# Patient Record
Sex: FEMALE | Race: WHITE | NOT HISPANIC OR LATINO | URBAN - METROPOLITAN AREA
[De-identification: names, ages, dates, MRNs, and addresses within clinical notes are randomized per-mention and may not be internally consistent; named-entity substitution may affect disease eponyms.]

---

## 2023-02-26 PROBLEM — N60.01 SOLITARY CYST OF RIGHT BREAST: Status: ACTIVE | Noted: 2023-02-26

## 2023-02-26 PROBLEM — N63.15 BREAST LUMP ON RIGHT SIDE AT 3 O'CLOCK POSITION: Status: ACTIVE | Noted: 2023-02-26

## 2023-02-26 PROBLEM — Z80.3 FAMILY HISTORY OF MALIGNANT NEOPLASM OF BREAST: Status: ACTIVE | Noted: 2023-02-26

## 2023-02-26 NOTE — PROGRESS NOTES
[facility]    Chief Complaint:   Shanti Schaeffer is a 62 y.o. female presenting today for her 6 month follow up.. She is due for Ultrasound.  She reports no interval changes.     History of Present Illness:   Mrs. Schaeffer first presented on August 18, 2021 after a right breast mass was noticed. Her imaging was consistent with a benign complicated cyst and close follow up was recommended. MD::: Luis Frances MD.    Past Medical History:   Diagnosis Date    Abnormal mammogram     Breast lump on right side at 3 o'clock position 2/26/2023    She understands that her imaging and exams have remained stable and is comfortable being followed in a conservative fashion. She understands the importance of monthly self-breast examination and knows to report any and all changes as they occur.    Family history of malignant neoplasm of breast 2/26/2023    Hyperlipidemia     Hypertension     Solitary cyst of right breast 2/26/2023    We discussed our fibrocystic mastopathy protocol in detail.       Past Surgical History:   Procedure Laterality Date    CHOLECYSTECTOMY      COLONOSCOPY      FACIAL RECONSTRUCTION SURGERY      CAM[facility]LE [facility]  07/31/1996        Current Outpatient Medications:     albuterol (VENTOLIN HFA) 90 mcg/actuation inhaler, Inhale 2 puffs into the lungs every 6 (six) hours as needed for Wheezing. Rescue, Disp: 18 g, Rfl: 0    aspirin (ECOTRIN) 81 MG EC tablet, Take 81 mg by mouth once daily., Disp: , Rfl:     atorvastatin (LIPITOR) 10 MG tablet, take 1 tablet by mouth once daily, Disp: 90 tablet, Rfl: 0    azithromycin (Z-JERRY) 250 MG tablet, Take 2 tablets by mouth on day 1; Take 1 tablet by mouth on days 2-5, Disp: 6 tablet, Rfl: 0    cholecalciferol, vitamin D3, (VITAMIN D3) 50 mcg (2,000 unit) Cap capsule, Take 1 capsule by mouth once daily., Disp: , Rfl:     CLENPIQ 10 mg-3.5 gram -12 gram/160 mL Soln, TAKE PREP BY MOUTH SPLIT DOSES AS DIRECTED FOR COLONOSCOPY PREP, Disp: ,  Rfl:     cyanocobalamin (VITAMIN B-12) 1000 MCG tablet, Take 100 mcg by mouth once daily., Disp: , Rfl:     estradioL (ESTRACE) 1 MG tablet, Take 0.5 tablets (0.5 mg total) by mouth once daily., Disp: 90 tablet, Rfl: 3    hydroCHLOROthiazide (HYDRODIURIL) 25 MG tablet, take 1 tablet by mouth once daily, Disp: 90 tablet, Rfl: 0    omega-3 fatty acids/fish oil (FISH OIL-OMEGA-3 FATTY ACIDS) 300-1,000 mg capsule, Take by mouth once daily., Disp: , Rfl:     ondansetron (ZOFRAN) 8 MG tablet, TAKE 1 TABLET BY MOUTH TWICE A DAY AS NEEDED FOR NAUSEA FOR 5 DAYS, Disp: , Rfl:     pantoprazole (PROTONIX) 40 MG tablet, TAKE 1 TABLET BY MOUTH EVERY MORNING 30 MINUTES BEFORE BREAKFAST, Disp: , Rfl:     propranoloL (INDERAL) 40 MG tablet, take 1 tablet by mouth once daily, Disp: 90 tablet, Rfl: 0    vitamin E 1000 UNIT capsule, Take 1,000 Units by mouth once daily., Disp: , Rfl:    Review of patient's allergies indicates:   Allergen Reactions    Chlorpheniramine-phenylephrine       Social History     Tobacco Use    Smoking status: Former    Smokeless tobacco: Never   Substance Use Topics    Alcohol use: Yes      Family History   Problem Relation Age of Onset    Breast cancer Mother     Colon cancer Brother       Review of Systems   Genitourinary:  Negative for hot flashes.   Integumentary:  Negative for color change, rash, mole/lesion, breast mass, breast discharge and breast tenderness.   Breast: Negative for mass and tenderness   Physical Exam  Chest:   Breasts:     Right: No mass, nipple discharge, skin change or tenderness.      Left: No mass, nipple discharge, skin change or tenderness.   Lymphadenopathy:      Upper Body:      Right upper body: No axillary adenopathy.      Left upper body: No axillary adenopathy.   Ultrasound: Stable complicated cyst   Mammogram:  Stable without suspicious interval change.    1. Breast lump on right side at 3 o'clock position  Overview:  She understands that her imaging and exams have  remained stable and is comfortable being followed in a conservative fashion. She understands the importance of monthly self-breast examination and knows to report any and all changes as they occur.      2. Solitary cyst of right breast  Overview:  We discussed our fibrocystic mastopathy protocol in detail.        3. Family history of malignant neoplasm of breast  Overview:  We discussed her family history and how it could impact her own future risks.  We discussed family vs. genetic history and the importance of each.  Genetic Counseling/Testing was offered, and all questions answered.                 Medical Decision Making:    It is my impression that this patient suffers all conditions contained in this medical document.  Each of these conditions did affect our plan of care and my medical decision making today.  It is my opinion that the medical decision making concerning this patient was of minimal  difficulty based on the aforementioned conditions.  Any further recommendations will be communicated to the patient by me.  I have reviewed and verified her allergies, list of medications, medical and surgical histories, social history, and a pertinent review of symptoms.     Follow up:  6 months and prn    For:  US diya henderson (D) right at [facility]

## 2023-03-01 ENCOUNTER — OFFICE VISIT (OUTPATIENT)
Dept: SURGERY | Facility: CLINIC | Age: 63
End: 2023-03-01
Payer: COMMERCIAL

## 2023-03-01 DIAGNOSIS — Z80.3 FAMILY HISTORY OF MALIGNANT NEOPLASM OF BREAST: ICD-10-CM

## 2023-03-01 DIAGNOSIS — N63.15 BREAST LUMP ON RIGHT SIDE AT 3 O'CLOCK POSITION: ICD-10-CM

## 2023-03-01 DIAGNOSIS — N60.01 SOLITARY CYST OF RIGHT BREAST: ICD-10-CM

## 2023-03-01 PROCEDURE — 99213 OFFICE O/P EST LOW 20 MIN: CPT | Mod: S$GLB,,, | Performed by: NURSE PRACTITIONER

## 2023-03-01 PROCEDURE — 99213 PR OFFICE/OUTPT VISIT, EST, LEVL III, 20-29 MIN: ICD-10-PCS | Mod: S$GLB,,, | Performed by: NURSE PRACTITIONER

## 2023-09-04 DIAGNOSIS — N63.15 BREAST LUMP ON RIGHT SIDE AT 3 O'CLOCK POSITION: Primary | ICD-10-CM

## 2023-09-05 NOTE — PROGRESS NOTES
[facility]  MD Shannon Saenz, NP-C    Chief Complaint:   Shanti Schaeffer is a 63 y.o. female presenting today for  6 month follow up. She is due for Ultrasound  She reports no interval changes on her self-breast examination.     History of Present Illness:   Mrs. Schaeffer first presented on August 18, 2021 after a right breast mass was noticed. Her imaging was consistent with a benign complicated cyst and close follow up was recommended. MD::: Luis Frances MD.    Past Medical History:   Diagnosis Date    Abnormal mammogram     Breast lump on right side at 3 o'clock position 2/26/2023    She understands that her imaging and exams have remained stable and is comfortable being followed in a conservative fashion. She understands the importance of monthly self-breast examination and knows to report any and all changes as they occur.    Family history of malignant neoplasm of breast 2/26/2023    Hyperlipidemia     Hypertension     Solitary cyst of right breast 2/26/2023    We discussed our fibrocystic mastopathy protocol in detail.       Past Surgical History:   Procedure Laterality Date    CHOLECYSTECTOMY      COLONOSCOPY      FACIAL RECONSTRUCTION SURGERY      CAMILLE [facility]  07/31[birth year]1996        Current Outpatient Medications:     aspirin (ECOTRIN) 81 MG EC tablet, Take 81 mg by mouth once daily., Disp: , Rfl:     atorvastatin (LIPITOR) 10 MG tablet, Take 1 tablet (10 mg total) by mouth once daily., Disp: 90 tablet, Rfl: 1    cholecalciferol, vitamin D3, (VITAMIN D3) 50 mcg (2,000 unit) Cap capsule, Take 1 capsule by mouth once daily., Disp: , Rfl:     cyanocobalamin (VITAMIN B-12) 1000 MCG tablet, Take 100 mcg by mouth once daily., Disp: , Rfl:     estradioL (ESTRACE) 1 MG tablet, Take 0.5 tablets (0.5 mg total) by mouth once daily., Disp: 90 tablet, Rfl: 3    varsha primrose/linoleic/g-lenic (PRIMROSE OIL ORAL), Take by mouth., Disp: , Rfl:     hydroCHLOROthiazide  (HYDRODIURIL) 25 MG tablet, Take 1 tablet (25 mg total) by mouth once daily., Disp: 90 tablet, Rfl: 1    omega-3 fatty acids/fish oil (FISH OIL-OMEGA-3 FATTY ACIDS) 300-1,000 mg capsule, Take by mouth once daily., Disp: , Rfl:     pantoprazole (PROTONIX) 40 MG tablet, TAKE 1 TABLET BY MOUTH EVERY MORNING 30 MINUTES BEFORE BREAKFAST, Disp: , Rfl:     propranoloL (INDERAL) 40 MG tablet, Take 1 tablet (40 mg total) by mouth once daily., Disp: 90 tablet, Rfl: 1    scopolamine (TRANSDERM-SCOP) 1.3-1.5 mg (1 mg over 3 days), Place 1 patch onto the skin every 72 hours., Disp: 4 patch, Rfl: 0    vitamin E 1000 UNIT capsule, Take 1,000 Units by mouth once daily., Disp: , Rfl:    Review of patient's allergies indicates:  No Known Allergies   Social History     Tobacco Use    Smoking status: Former    Smokeless tobacco: Never   Substance Use Topics    Alcohol use: Yes      Family History   Problem Relation Age of Onset    Breast cancer Mother     Cancer Father         bone    Colon cancer Brother         Review of Systems   Integumentary:  Negative for color change, rash, mole/lesion, breast mass, breast discharge and breast tenderness.   Breast: Negative for mass and tenderness       Physical Exam   HENT:   Head: Normocephalic.   Pulmonary/Chest: Right breast exhibits no inverted nipple, no mass, no nipple discharge, no skin change and no tenderness. Left breast exhibits no inverted nipple, no mass, no nipple discharge, no skin change and no tenderness. No breast swelling.   Genitourinary: No breast swelling.   Musculoskeletal: Lymphadenopathy:      Upper Body:      Right upper body: No supraclavicular or axillary adenopathy.      Left upper body: No supraclavicular or axillary adenopathy.     Neurological: She is alert.      Ultrasound: Stable benign appearing cystic changes.    Assessment/Plan  1. Breast lump on right side at 3 o'clock position  Assessment & Plan:  We reviewed our findings today and her questions were  answered.  She understands that her imaging and exams have remained stable (and show nothing concerning).  She is comfortable being followed in a conservative fashion.      She understands the importance of monthly self-breast examination and knows to report any and all changes as they occur.'      2. Solitary cyst of right breast  Assessment & Plan:  We discussed our Fibrocystic Mastopathy Protocol in detail. She should take Vitamin E 800 IU everyday x 3 months or until non-tender then can stop Vitamin E vs. continue daily at 400 IU.  The use of ice packs or warms soaks to tender area of the breast may also be of some benefit.  If warm soaks help her tenderness - She can use Aspercreme (unless allergic to Aspirin) on the affected area.  Ibuprofen (if no contraindications) at 800 mg three times per day for 5 days can also relieve many symptoms associated with swollen or inflamed tissue.  She can repeat Ibuprofen for 5 days, but then should be off for 5 days as it may cause gastric upset.  It is a good idea to wear a tight bra during the day and night to minimize movement of the tender area (Sports Bras work well).  Evening Primrose Oil can be bought over the counter and used at a dose of 3000 mg per day to help with any breast pain/tenderness not improved by implementing the above measures.        3. Family history of malignant neoplasm of breast  Assessment & Plan:  We discussed her family history and how it could impact her own future risks.  We discussed family vs. genetic history and the importance and implications of each.  All questions answered to her satisfaction.  She knows that as additional family members are diagnosed - she will need to let us know as this may change follow up and imaging recommendations.    We had a discussion concerning Breast Cancer Risk Reduction and current NCCN Guidelines. She knows that her risk can be lowered slightly with a healthy lifestyle and minimal ETOH use. Being  physically active will also help. She should reduce or stay away from OCPs and HRT as possible.                Medical Decision Making:  It is my impression that this patient suffers all conditions contained in this medical document.  Each of these conditions did affect our plan of care and my medical decision making today.  It is my opinion that the medical decision making concerning this patient was of moderate difficulty based on the aforementioned conditions.  Any further recommendations will be communicated to the patient by me.  I have reviewed and verified her allergies, list of medications, medical and surgical histories, social history, and a pertinent review of symptoms.      Follow up:  6 months and prn    For:  MGLAMINE (D) at [facility] and  (D) diya henderson right at [facility]

## 2023-09-05 NOTE — ASSESSMENT & PLAN NOTE
We reviewed our findings today and her questions were answered.  She understands that her imaging and exams have remained stable (and show nothing concerning).  She is comfortable being followed in a conservative fashion.      She understands the importance of monthly self-breast examination and knows to report any and all changes as they occur.'

## 2023-09-13 ENCOUNTER — OFFICE VISIT (OUTPATIENT)
Dept: SURGERY | Facility: CLINIC | Age: 63
End: 2023-09-13
Payer: COMMERCIAL

## 2023-09-13 DIAGNOSIS — N63.15 BREAST LUMP ON RIGHT SIDE AT 3 O'CLOCK POSITION: Primary | ICD-10-CM

## 2023-09-13 DIAGNOSIS — N60.01 SOLITARY CYST OF RIGHT BREAST: ICD-10-CM

## 2023-09-13 DIAGNOSIS — Z80.3 FAMILY HISTORY OF MALIGNANT NEOPLASM OF BREAST: ICD-10-CM

## 2023-09-13 PROCEDURE — 99213 PR OFFICE/OUTPT VISIT, EST, LEVL III, 20-29 MIN: ICD-10-PCS | Mod: S$GLB,,, | Performed by: NURSE PRACTITIONER

## 2023-09-13 PROCEDURE — 1160F PR REVIEW ALL MEDS BY PRESCRIBER/CLIN PHARMACIST DOCUMENTED: ICD-10-PCS | Mod: CPTII,S$GLB,, | Performed by: NURSE PRACTITIONER

## 2023-09-13 PROCEDURE — 99213 OFFICE O/P EST LOW 20 MIN: CPT | Mod: S$GLB,,, | Performed by: NURSE PRACTITIONER

## 2023-09-13 PROCEDURE — 1159F MED LIST DOCD IN RCRD: CPT | Mod: CPTII,S$GLB,, | Performed by: NURSE PRACTITIONER

## 2023-09-13 PROCEDURE — 1159F PR MEDICATION LIST DOCUMENTED IN MEDICAL RECORD: ICD-10-PCS | Mod: CPTII,S$GLB,, | Performed by: NURSE PRACTITIONER

## 2023-09-13 PROCEDURE — 1160F RVW MEDS BY RX/DR IN RCRD: CPT | Mod: CPTII,S$GLB,, | Performed by: NURSE PRACTITIONER

## 2023-09-13 PROCEDURE — 3044F HG A1C LEVEL LT 7.0%: CPT | Mod: CPTII,S$GLB,, | Performed by: NURSE PRACTITIONER

## 2023-09-13 PROCEDURE — 3044F PR MOST RECENT HEMOGLOBIN A1C LEVEL <7.0%: ICD-10-PCS | Mod: CPTII,S$GLB,, | Performed by: NURSE PRACTITIONER

## 2024-03-04 DIAGNOSIS — N63.15 BREAST LUMP ON RIGHT SIDE AT 3 O'CLOCK POSITION: Primary | ICD-10-CM

## 2024-03-05 NOTE — ASSESSMENT & PLAN NOTE
We discussed her family history and how it could impact her own future risks.  We discussed family vs. genetic history and the importance and implications of each.  All questions answered to her satisfaction.  She knows that as additional family members are diagnosed - she will need to let us know as this may change follow up and imaging recommendations.    Long discussion with her about her family history and the increased risk that is attributable.  We discussed Genetic Counseling/Testing at length and information was given to address these.   Her Lifetime Risk is only 10.95% based on the [employer] Tool. The population risk is 6.79%- so she not at a very high risk.   Her family and personal history were confirmed, and I believe this risk to be true based on its limitations.  She knows that there are certain elements that increase/decrease her risk that are not amendable to .  This risk is less than 20% and does not meet the threshold for additional annual imaging with MRI.  She will also not need to be followed in the High-Risk setting and can be seen on an annual basis.     We had a discussion concerning Breast Cancer Risk Reduction and current NCCN Guidelines. She knows that her risk can be lowered slightly with a healthy lifestyle and minimal ETOH use. Being physically active will also help. She should reduce or stay away from OCPs and HRT as possible.

## 2024-03-05 NOTE — PROGRESS NOTES
[facility]  MD Shannon Saenz, NP-C    Date of Service: 3/11/2024      Chief Complaint:   Shanti Schaeffer is a 63 y.o. female presenting today for  6 month follow up. She is due for Mammogram and Ultrasound  She reports no interval changes on her self-breast examination.     History of Present Illness:   Mrs. Schaeffer first presented on August 18, 2021 after a right breast mass was noticed. Her imaging was consistent with a benign complicated cyst and close follow up was recommended. MD::: Luis Frances MD; Shivam Allen MD.     Past Medical History:   Diagnosis Date    Abnormal mammogram     Breast lump on right side at 3 o'clock position 2/26/2023    She understands that her imaging and exams have remained stable and is comfortable being followed in a conservative fashion. She understands the importance of monthly self-breast examination and knows to report any and all changes as they occur.    Family history of malignant neoplasm of breast 2/26/2023    Hyperlipidemia     Hypertension     Solitary cyst of right breast 2/26/2023    We discussed our fibrocystic mastopathy protocol in detail.       Past Surgical History:   Procedure Laterality Date    CHOLECYSTECTOMY      COLONOSCOPY      FACIAL RECONSTRUCTION SURGERY      [facility]ILLE [facility]  07/31[birth year]1996        Current Outpatient Medications:     aspirin (ECOTRIN) 81 MG EC tablet, Take 81 mg by mouth once daily., Disp: , Rfl:     atorvastatin (LIPITOR) 10 MG tablet, Take 1 tablet (10 mg total) by mouth once daily., Disp: 90 tablet, Rfl: 0    cholecalciferol, vitamin D3, (VITAMIN D3) 50 mcg (2,000 unit) Cap capsule, Take 1 capsule by mouth once daily., Disp: , Rfl:     cyanocobalamin (VITAMIN B-12) 1000 MCG tablet, Take 100 mcg by mouth once daily., Disp: , Rfl:     dicyclomine (BENTYL) 10 MG capsule, Take 1 capsule by mouth three times a day as needed for pain, Disp: , Rfl:     estradioL (ESTRACE) 1 MG tablet,  Take 0.5 tablets (0.5 mg total) by mouth once daily., Disp: 45 tablet, Rfl: 3    varsha primrose/linoleic/g-lenic (PRIMROSE OIL ORAL), Take by mouth., Disp: , Rfl:     hydroCHLOROthiazide (HYDRODIURIL) 25 MG tablet, take 1 tablet by mouth once a day, Disp: 90 tablet, Rfl: 0    omega-3 fatty acids/fish oil (FISH OIL-OMEGA-3 FATTY ACIDS) 300-1,000 mg capsule, Take by mouth once daily., Disp: , Rfl:     omeprazole (PRILOSEC) 40 MG capsule, Take 40 mg by mouth every morning. before breakfast., Disp: , Rfl:     propranoloL (INDERAL) 40 MG tablet, Take 1 tablet (40 mg total) by mouth once daily., Disp: 90 tablet, Rfl: 0    scopolamine (TRANSDERM-SCOP) 1.3-1.5 mg (1 mg over 3 days), Place 1 patch onto the skin every 72 hours., Disp: 4 patch, Rfl: 0    vitamin E 1000 UNIT capsule, Take 1,000 Units by mouth once daily., Disp: , Rfl:    Review of patient's allergies indicates:  No Known Allergies   Social History     Tobacco Use    Smoking status: Former    Smokeless tobacco: Never   Substance Use Topics    Alcohol use: Yes      Family History   Problem Relation Age of Onset    Breast cancer Mother 50 - 59        dx 50's    Cancer Father         bone    Colon cancer Brother     Ovarian cancer Neg Hx         Review of Systems   Integumentary:  Negative for color change, rash, mole/lesion, breast mass, breast discharge and breast tenderness.   Breast: Negative for mass and tenderness       Physical Exam   HENT:   Head: Normocephalic.   Pulmonary/Chest: Right breast exhibits no inverted nipple, no mass, no nipple discharge, no skin change and no tenderness. Left breast exhibits no inverted nipple, no mass, no nipple discharge, no skin change and no tenderness. No breast swelling.   Genitourinary: No breast swelling.   Musculoskeletal: Lymphadenopathy:      Upper Body:      Right upper body: No supraclavicular or axillary adenopathy.      Left upper body: No supraclavicular or axillary adenopathy.     Neurological: She is alert.       MAMMOGRAM REPORT: She has some diffuse fibronodular tissue; there are no spiculated lesions, distortions or suspicious calcifications noted; NEM    Ultrasound: Stable benign-appearing mass measuring 3 mm in diameter in the right breast 3 o'clock position 3 cm from the nipple. This is unchanged dating back to 08/18/2021. No additional is follow-up necessary.     NOTE:::We viewed her films together at today's visit.  We discussed the multiple views obtained and the important findings.  Even benign changes were mentioned and her questions were answered.  She knows that she may receive a formal letter or report from the Radiologist.  She is to contact us if she has questions.      Assessment/Plan  1. Breast lump on right side at 3 o'clock position  Assessment & Plan:  We reviewed our findings today and her questions were answered.  She understands that her imaging and exams have remained stable (and show nothing concerning).  She is comfortable being followed in a conservative fashion.      She understands the importance of monthly self-breast examination and knows to report any and all changes as they occur.'      2. Solitary cyst of right breast  Assessment & Plan:  We discussed our Fibrocystic Mastopathy Protocol in detail. She should take Vitamin E 800 IU everyday x 3 months or until non-tender then can stop Vitamin E vs. continue daily at 400 IU.  The use of ice packs or warms soaks to tender area of the breast may also be of some benefit.  If warm soaks help her tenderness - She can use Aspercreme (unless allergic to Aspirin) on the affected area.  Ibuprofen (if no contraindications) at 800 mg three times per day for 5 days can also relieve many symptoms associated with swollen or inflamed tissue.  She can repeat Ibuprofen for 5 days, but then should be off for 5 days as it may cause gastric upset.  It is a good idea to wear a tight bra during the day and night to minimize movement of the tender area (Sports  Bras work well).  Evening Primrose Oil can be bought over the counter and used at a dose of 3000 mg per day to help with any breast pain/tenderness not improved by implementing the above measures.        3. Family history of malignant neoplasm of breast  Assessment & Plan:  We discussed her family history and how it could impact her own future risks.  We discussed family vs. genetic history and the importance and implications of each.  All questions answered to her satisfaction.  She knows that as additional family members are diagnosed - she will need to let us know as this may change follow up and imaging recommendations.    Long discussion with her about her family history and the increased risk that is attributable.  We discussed Genetic Counseling/Testing at length and information was given to address these.   Her Lifetime Risk is only 10.95% based on the [employer] Tool. The population risk is 6.79%- so she not at a very high risk.   Her family and personal history were confirmed, and I believe this risk to be true based on its limitations.  She knows that there are certain elements that increase/decrease her risk that are not amendable to .  This risk is less than 20% and does not meet the threshold for additional annual imaging with MRI.  She will also not need to be followed in the High-Risk setting and can be seen on an annual basis.     We had a discussion concerning Breast Cancer Risk Reduction and current NCCN Guidelines. She knows that her risk can be lowered slightly with a healthy lifestyle and minimal ETOH use. Being physically active will also help. She should reduce or stay away from OCPs and HRT as possible.                Medical Decision Making:  It is my impression that this patient suffers all conditions contained in this medical document.  Each of these conditions did affect our plan of care and my medical decision making today.  It is my opinion that the medical decision making  concerning this patient was of moderate difficulty based on the aforementioned conditions.  Any further recommendations will be communicated to the patient by me.  I have reviewed and verified her allergies, list of medications, medical and surgical histories, social history, and a pertinent review of symptoms.      Follow up:  1 year and prn    For:  DAMON OWEN) at [facility]

## 2024-03-11 ENCOUNTER — OFFICE VISIT (OUTPATIENT)
Dept: SURGERY | Facility: CLINIC | Age: 64
End: 2024-03-11
Payer: COMMERCIAL

## 2024-03-11 VITALS — HEIGHT: 66 IN | WEIGHT: 190 LBS | BODY MASS INDEX: 30.53 KG/M2

## 2024-03-11 DIAGNOSIS — N63.15 BREAST LUMP ON RIGHT SIDE AT 3 O'CLOCK POSITION: Primary | ICD-10-CM

## 2024-03-11 DIAGNOSIS — N60.01 SOLITARY CYST OF RIGHT BREAST: ICD-10-CM

## 2024-03-11 DIAGNOSIS — Z80.3 FAMILY HISTORY OF MALIGNANT NEOPLASM OF BREAST: ICD-10-CM

## 2024-03-11 PROCEDURE — 1160F RVW MEDS BY RX/DR IN RCRD: CPT | Mod: CPTII,S$GLB,, | Performed by: NURSE PRACTITIONER

## 2024-03-11 PROCEDURE — 99999 PR PBB SHADOW E&M-EST. PATIENT-LVL III: CPT | Mod: PBBFAC,,, | Performed by: NURSE PRACTITIONER

## 2024-03-11 PROCEDURE — 3008F BODY MASS INDEX DOCD: CPT | Mod: CPTII,S$GLB,, | Performed by: NURSE PRACTITIONER

## 2024-03-11 PROCEDURE — 1159F MED LIST DOCD IN RCRD: CPT | Mod: CPTII,S$GLB,, | Performed by: NURSE PRACTITIONER

## 2024-03-11 PROCEDURE — 99213 OFFICE O/P EST LOW 20 MIN: CPT | Mod: S$GLB,,, | Performed by: NURSE PRACTITIONER

## 2024-03-11 RX ORDER — DICYCLOMINE HYDROCHLORIDE 10 MG/1
CAPSULE ORAL
COMMUNITY
Start: 2024-02-15

## 2024-11-05 PROBLEM — M79.621 PAIN IN BOTH UPPER ARMS: Status: ACTIVE | Noted: 2024-09-19

## 2024-11-05 PROBLEM — R55 POSTURAL DIZZINESS WITH PRESYNCOPE: Status: ACTIVE | Noted: 2024-10-04

## 2024-11-05 PROBLEM — E78.2 MIXED HYPERLIPIDEMIA: Status: ACTIVE | Noted: 2024-10-04

## 2024-11-05 PROBLEM — I10 PRIMARY HYPERTENSION: Status: ACTIVE | Noted: 2024-09-24

## 2024-11-05 PROBLEM — K21.9 GERD (GASTROESOPHAGEAL REFLUX DISEASE): Status: ACTIVE | Noted: 2024-09-24

## 2024-11-05 PROBLEM — E87.6 HYPOKALEMIA: Status: ACTIVE | Noted: 2024-07-02

## 2024-11-05 PROBLEM — I48.91 NEW ONSET A-FIB: Status: ACTIVE | Noted: 2024-10-10

## 2024-11-05 PROBLEM — R42 POSTURAL DIZZINESS WITH PRESYNCOPE: Status: ACTIVE | Noted: 2024-10-04

## 2024-11-05 PROBLEM — M79.622 PAIN IN BOTH UPPER ARMS: Status: ACTIVE | Noted: 2024-09-19

## 2024-11-05 PROBLEM — R55 PRE-SYNCOPE: Status: ACTIVE | Noted: 2024-07-02

## 2025-03-06 ENCOUNTER — PATIENT MESSAGE (OUTPATIENT)
Dept: SURGERY | Facility: CLINIC | Age: 65
End: 2025-03-06
Payer: COMMERCIAL